# Patient Record
Sex: FEMALE | NOT HISPANIC OR LATINO | Employment: FULL TIME | URBAN - METROPOLITAN AREA
[De-identification: names, ages, dates, MRNs, and addresses within clinical notes are randomized per-mention and may not be internally consistent; named-entity substitution may affect disease eponyms.]

---

## 2024-01-22 ASSESSMENT — ENCOUNTER SYMPTOMS
HEADACHES: 0
NERVOUS/ANXIOUS: 0
MYALGIAS: 0
DIZZINESS: 0
ARTHRALGIAS: 0
HEMATURIA: 0
PARESTHESIAS: 0
PALPITATIONS: 0
FREQUENCY: 0
JOINT SWELLING: 0
CONSTIPATION: 0
CHILLS: 0
EYE PAIN: 0
FEVER: 0
NAUSEA: 0
WEAKNESS: 0
DIARRHEA: 0
DYSURIA: 0
COUGH: 0
HEMATOCHEZIA: 0
SORE THROAT: 0
HEARTBURN: 0
SHORTNESS OF BREATH: 0
ABDOMINAL PAIN: 0
BREAST MASS: 0

## 2024-01-23 ENCOUNTER — OFFICE VISIT (OUTPATIENT)
Dept: FAMILY MEDICINE | Facility: CLINIC | Age: 34
End: 2024-01-23
Payer: COMMERCIAL

## 2024-01-23 VITALS
BODY MASS INDEX: 26.74 KG/M2 | HEIGHT: 67 IN | OXYGEN SATURATION: 100 % | SYSTOLIC BLOOD PRESSURE: 106 MMHG | RESPIRATION RATE: 16 BRPM | DIASTOLIC BLOOD PRESSURE: 71 MMHG | TEMPERATURE: 98.3 F | HEART RATE: 66 BPM | WEIGHT: 170.4 LBS

## 2024-01-23 DIAGNOSIS — Z00.00 ROUTINE GENERAL MEDICAL EXAMINATION AT A HEALTH CARE FACILITY: Primary | ICD-10-CM

## 2024-01-23 DIAGNOSIS — Z30.09 FAMILY PLANNING COUNSELING: ICD-10-CM

## 2024-01-23 DIAGNOSIS — Z11.59 NEED FOR HEPATITIS C SCREENING TEST: ICD-10-CM

## 2024-01-23 DIAGNOSIS — Z12.4 CERVICAL CANCER SCREENING: ICD-10-CM

## 2024-01-23 DIAGNOSIS — Z11.4 SCREENING FOR HIV (HUMAN IMMUNODEFICIENCY VIRUS): ICD-10-CM

## 2024-01-23 LAB — HBA1C MFR BLD: 4.9 % (ref 0–5.6)

## 2024-01-23 PROCEDURE — 87591 N.GONORRHOEAE DNA AMP PROB: CPT

## 2024-01-23 PROCEDURE — 80048 BASIC METABOLIC PNL TOTAL CA: CPT

## 2024-01-23 PROCEDURE — 80061 LIPID PANEL: CPT

## 2024-01-23 PROCEDURE — 83036 HEMOGLOBIN GLYCOSYLATED A1C: CPT

## 2024-01-23 PROCEDURE — 99385 PREV VISIT NEW AGE 18-39: CPT | Mod: 25

## 2024-01-23 PROCEDURE — 86803 HEPATITIS C AB TEST: CPT

## 2024-01-23 PROCEDURE — G0145 SCR C/V CYTO,THINLAYER,RESCR: HCPCS

## 2024-01-23 PROCEDURE — 87389 HIV-1 AG W/HIV-1&-2 AB AG IA: CPT

## 2024-01-23 PROCEDURE — 87491 CHLMYD TRACH DNA AMP PROBE: CPT

## 2024-01-23 PROCEDURE — 36415 COLL VENOUS BLD VENIPUNCTURE: CPT

## 2024-01-23 PROCEDURE — 87624 HPV HI-RISK TYP POOLED RSLT: CPT

## 2024-01-23 ASSESSMENT — ENCOUNTER SYMPTOMS
DIARRHEA: 0
SORE THROAT: 0
ABDOMINAL PAIN: 0
ARTHRALGIAS: 0
NAUSEA: 0
HEMATURIA: 0
WEAKNESS: 0
HEADACHES: 0
CHILLS: 0
MYALGIAS: 0
DIZZINESS: 0
COUGH: 0
CONSTIPATION: 0
FREQUENCY: 0
NERVOUS/ANXIOUS: 0
SHORTNESS OF BREATH: 0
FEVER: 0
JOINT SWELLING: 0
DYSURIA: 0
PALPITATIONS: 0
EYE PAIN: 0

## 2024-01-23 NOTE — PROGRESS NOTES
Preventive Care Visit  [facility] CAN Patton CNP, Nurse Practitioner Primary Care  Jan 23, 2024       SUBJECTIVE:   Unique is a 33 year old, presenting for the following:  Physical (Discuss conceiving concerns)        1/23/2024     4:29 PM   Additional Questions   Roomed by jordan perla     Healthy Habits:     Getting at least 3 servings of Calcium per day:  Yes    Bi-annual eye exam:  Yes    Dental care twice a year:  Yes    Sleep apnea or symptoms of sleep apnea:  None    Diet:  Regular (no restrictions)    Frequency of exercise:  2-3 days/week    Duration of exercise:  15-30 minutes    Taking medications regularly:  Yes    Medication side effects:  Not applicable    Additional concerns today:  Yes    Nexplanon for two years and started to develop discharge every day and so has been taking progesterone. Plans to start trying to conceive in 6-8 months. Her mother lost two babies to Toxoplasmosis and is concerned that this may happen to her.     Works for [employer]    Today's PHQ-2 Score:       1/22/2024     7:10 PM   PHQ-2 ( 1999 [employer])   Q1: Little interest or pleasure in doing things 0   Q2: Feeling down, depressed or hopeless 0   PHQ-2 Score 0   Q1: Little interest or pleasure in doing things Not at all   Q2: Feeling down, depressed or hopeless Not at all   PHQ-2 Score 0     Have you ever done Advance Care Planning? (For example, a Health Directive, POLST, or a discussion with a medical provider or your loved ones about your wishes): No, advance care planning information given to patient to review.  Patient plans to discuss their wishes with loved ones or provider.      Social History     Tobacco Use    Smoking status: Never    Smokeless tobacco: Never   Substance Use Topics    Alcohol use: Never             1/22/2024     7:09 PM   Alcohol Use   Prescreen: >3 drinks/day or >7 drinks/week? No     Reviewed orders with patient.  Reviewed health maintenance and updated orders accordingly  "- Yes  Lab work is in process    Breast Cancer Screenin/22/2024     7:12 PM   Breast CA Risk Assessment (FHS-7)   Do you have a family history of breast, colon, or ovarian cancer? No / Unknown       Patient under 40 years of age: Routine Mammogram Screening not recommended.   Pertinent mammograms are reviewed under the imaging tab.    Discussed self breast exams    History of abnormal Pap smear: NO - age 30-65 PAP every 5 years with negative HPV co-testing recommended     Reviewed and updated as needed this visit by clinical staff   Tobacco  Allergies  Meds  Problems  Med Hx  Surg Hx  Fam Hx          Reviewed and updated as needed this visit by Provider   Tobacco  Allergies  Meds  Problems  Med Hx  Surg Hx  Fam Hx          Review of Systems   Constitutional:  Negative for chills and fever.   HENT:  Negative for congestion, ear pain, hearing loss and sore throat.    Eyes:  Negative for pain and visual disturbance.   Respiratory:  Negative for cough and shortness of breath.    Cardiovascular:  Negative for chest pain and palpitations.   Gastrointestinal:  Negative for abdominal pain, constipation, diarrhea and nausea.   Genitourinary:  Negative for dysuria, frequency, genital sores, hematuria, pelvic pain, urgency, vaginal bleeding and vaginal discharge.   Musculoskeletal:  Negative for arthralgias, joint swelling and myalgias.   Skin:  Negative for rash.   Neurological:  Negative for dizziness, weakness and headaches.   Psychiatric/Behavioral:  The patient is not nervous/anxious.        OBJECTIVE:   /71 (BP Location: Left arm, Patient Position: Sitting, Cuff Size: Adult Regular)   Pulse 66   Temp 98.3  F (36.8  C) (Temporal)   Resp 16   Ht 1.689 m (5' 6.5\")   Wt 77.3 kg (170 lb 6.4 oz)   LMP  (LMP Unknown)   SpO2 100%   BMI 27.09 kg/m     Estimated body mass index is 27.09 kg/m  as calculated from the following:    Height as of this encounter: 1.689 m (5' 6.5\").    Weight as of " this encounter: 77.3 kg (170 lb 6.4 oz).  Physical Exam  GENERAL: alert and no distress  EYES: Eyes grossly normal to inspection, PERRL and conjunctivae and sclerae normal  HENT: ear canals and TM's normal, nose and mouth without ulcers or lesions  NECK: no adenopathy, no asymmetry, masses, or scars  RESP: lungs clear to auscultation - no rales, rhonchi or wheezes  CV: regular rate and rhythm, normal S1 S2, no S3 or S4, no murmur, click or rub, no peripheral edema  ABDOMEN: soft, nontender, no hepatosplenomegaly, no masses and bowel sounds normal   (female): normal female external genitalia, normal urethral meatus, vaginal mucosa pink, moist, well rugated, and normal cervix without masses or discharge. PAP collected  MS: no gross musculoskeletal defects noted, no edema  SKIN: no suspicious lesions or rashes  NEURO: Normal strength and tone, mentation intact and speech normal  PSYCH: mentation appears normal, affect normal/bright  LYMPH: no cervical, supraclavicular, axillary, or inguinal adenopathy        ASSESSMENT/PLAN:   Routine general medical examination at a health care facility  Care gaps addressed. Routine labs ordered  - Lipid panel reflex to direct LDL Non-fasting; Future  - Basic metabolic panel  (Ca, Cl, CO2, Creat, Gluc, K, Na, BUN); Future  - Hemoglobin A1c; Future  - NEISSERIA GONORRHOEA PCR  - CHLAMYDIA TRACHOMATIS PCR  - Lipid panel reflex to direct LDL Non-fasting  - Basic metabolic panel  (Ca, Cl, CO2, Creat, Gluc, K, Na, BUN)  - Hemoglobin A1c    Screening for HIV (human immunodeficiency virus)  Discussed, ordered  - HIV Antigen Antibody Combo; Future  - HIV Antigen Antibody Combo    Need for hepatitis C screening test  Discussed, ordered  - Hepatitis C Screen Reflex to HCV RNA Quant and Genotype; Future  - Hepatitis C Screen Reflex to HCV RNA Quant and Genotype    Cervical cancer screening  PAP collected  - Pap Screen with HPV - recommended age 30 - 65 years    Family planning  "counseling  Mother lost two children to toxoplasmosis and is concerned that this may happen to her. She plans to start trying for children in the next 6-8 weeks. Has a nexplanon in currently. Referral placed for Ob/Gyn to discuss family planning and remove Nexplanon   - Ob/Gyn  Referral; Future          Counseling  Reviewed preventive health counseling, as reflected in patient instructions  Special attention given to:        Self breast exams       Regular exercise       Healthy diet/nutrition       Contraception       Family planning       Consider Hep C screening for all patients one time for ages 18-79 years       HIV screeninx in teen years, 1x in adult years, and at intervals if high risk      BMI  Estimated body mass index is 27.09 kg/m  as calculated from the following:    Height as of this encounter: 1.689 m (5' 6.5\").    Weight as of this encounter: 77.3 kg (170 lb 6.4 oz).         She reports that she has never smoked. She has never used smokeless tobacco.      Signed Electronically by: CAN Givens CNP  Answers submitted by the patient for this visit:  Annual Preventive Visit (Submitted on 2024)  Chief Complaint: Annual Exam:  Blood in stool: No  heartburn: No  peripheral edema: No  mood changes: No  Skin sensation changes: No  tenderness: No  breast mass: No  breast discharge: No    "

## 2024-01-24 LAB
ANION GAP SERPL CALCULATED.3IONS-SCNC: 13 MMOL/L (ref 7–15)
BUN SERPL-MCNC: 13.4 MG/DL (ref 6–20)
CALCIUM SERPL-MCNC: 9.6 MG/DL (ref 8.6–10)
CHLORIDE SERPL-SCNC: 100 MMOL/L (ref 98–107)
CHOLEST SERPL-MCNC: 214 MG/DL
CREAT SERPL-MCNC: 0.82 MG/DL (ref 0.51–0.95)
DEPRECATED HCO3 PLAS-SCNC: 25 MMOL/L (ref 22–29)
EGFRCR SERPLBLD CKD-EPI 2021: >90 ML/MIN/1.73M2
FASTING STATUS PATIENT QL REPORTED: ABNORMAL
GLUCOSE SERPL-MCNC: 85 MG/DL (ref 70–99)
HCV AB SERPL QL IA: NONREACTIVE
HDLC SERPL-MCNC: 68 MG/DL
HIV 1+2 AB+HIV1 P24 AG SERPL QL IA: NONREACTIVE
LDLC SERPL CALC-MCNC: 133 MG/DL
NONHDLC SERPL-MCNC: 146 MG/DL
POTASSIUM SERPL-SCNC: 4.6 MMOL/L (ref 3.4–5.3)
SODIUM SERPL-SCNC: 138 MMOL/L (ref 135–145)
TRIGL SERPL-MCNC: 67 MG/DL

## 2024-01-24 NOTE — RESULT ENCOUNTER NOTE
Alli Calhoun,     It was nice meeting you the other day,      Your cholesterol is slightly elevated. Working on diet and exercise can help bring down your cholesterol. I added some info below on ways to decrease your cholesterol.     Your kidneys, electrolytes and blood sugar are normal.     Your PAP and Chlamydia and gonorrhoea screenings will come back later this week.      Feel free to contact me via [facility] or call the clinic at [phone].     Sincerely,  Tara Bennett, LEYDA, FNP-C

## 2024-01-25 LAB
C TRACH DNA SPEC QL NAA+PROBE: NEGATIVE
N GONORRHOEA DNA SPEC QL NAA+PROBE: NEGATIVE

## 2024-01-29 LAB
BKR LAB AP GYN ADEQUACY: NORMAL
BKR LAB AP GYN INTERPRETATION: NORMAL
BKR LAB AP HPV REFLEX: NORMAL
BKR LAB AP PREVIOUS ABNORMAL: NORMAL
PATH REPORT.COMMENTS IMP SPEC: NORMAL
PATH REPORT.COMMENTS IMP SPEC: NORMAL
PATH REPORT.RELEVANT HX SPEC: NORMAL

## 2024-01-31 ENCOUNTER — PATIENT OUTREACH (OUTPATIENT)
Dept: FAMILY MEDICINE | Facility: CLINIC | Age: 34
End: 2024-01-31
Payer: COMMERCIAL

## 2024-01-31 DIAGNOSIS — R87.810 CERVICAL HIGH RISK HPV (HUMAN PAPILLOMAVIRUS) TEST POSITIVE: Primary | ICD-10-CM

## 2024-01-31 LAB
HUMAN PAPILLOMA VIRUS 16 DNA: NEGATIVE
HUMAN PAPILLOMA VIRUS 18 DNA: NEGATIVE
HUMAN PAPILLOMA VIRUS FINAL DIAGNOSIS: ABNORMAL
HUMAN PAPILLOMA VIRUS OTHER HR: POSITIVE

## 2024-09-30 ENCOUNTER — OFFICE VISIT (OUTPATIENT)
Dept: INTERNAL MEDICINE | Facility: CLINIC | Age: 34
End: 2024-09-30
Attending: INTERNAL MEDICINE
Payer: COMMERCIAL

## 2024-09-30 VITALS
BODY MASS INDEX: 26.84 KG/M2 | DIASTOLIC BLOOD PRESSURE: 79 MMHG | SYSTOLIC BLOOD PRESSURE: 121 MMHG | HEIGHT: 67 IN | HEART RATE: 76 BPM | WEIGHT: 171 LBS

## 2024-09-30 DIAGNOSIS — Z31.69 ENCOUNTER FOR PRECONCEPTION CONSULTATION: ICD-10-CM

## 2024-09-30 DIAGNOSIS — N93.9 ABNORMAL UTERINE BLEEDING: Primary | ICD-10-CM

## 2024-09-30 PROCEDURE — 99203 OFFICE O/P NEW LOW 30 MIN: CPT | Performed by: INTERNAL MEDICINE

## 2024-09-30 PROCEDURE — 99213 OFFICE O/P EST LOW 20 MIN: CPT | Performed by: INTERNAL MEDICINE

## 2024-09-30 SDOH — HEALTH STABILITY: PHYSICAL HEALTH: ON AVERAGE, HOW MANY MINUTES DO YOU ENGAGE IN EXERCISE AT THIS LEVEL?: 20 MIN

## 2024-09-30 SDOH — HEALTH STABILITY: PHYSICAL HEALTH: ON AVERAGE, HOW MANY DAYS PER WEEK DO YOU ENGAGE IN MODERATE TO STRENUOUS EXERCISE (LIKE A BRISK WALK)?: 4 DAYS

## 2024-09-30 ASSESSMENT — SOCIAL DETERMINANTS OF HEALTH (SDOH): HOW OFTEN DO YOU GET TOGETHER WITH FRIENDS OR RELATIVES?: ONCE A WEEK

## 2024-09-30 NOTE — PROGRESS NOTES
"  Assessment & Plan     Abnormal uterine bleeding  Discussed possible causes of abnormal uterine bleeding.  Given that patient is currently on Nexplanon, suspect breakthrough bleeding.  Recommend consultation with OB/GYN for further evaluation and assistance with management.  Pelvic ultrasound was ordered.  - US Pelvic Complete with Transvaginal; Future  - Ob/Gyn  Referral; Future    Preconception counseling.  Discussed exposure to cats as risk factor for toxoplasmosis.  Given the patient's mom was living in [place] with a lot of stray cats around her, toxoplasmosis was likely a consequence of exposure.  Patient was assured that her risk is significantly lower given that she does not own a cat.  Reviewed healthy strategies, including healthy diet and exercise with patient and her .    I spent a total of 30 minutes on the day of the visit.   Time spent by me doing chart review, history and exam, documentation and further activities per the note    BMI  Estimated body mass index is 27.19 kg/m  as calculated from the following:    Height as of this encounter: 1.689 m (5' 6.5\").    Weight as of this encounter: 77.6 kg (171 lb).       Counseling  Appropriate preventive services were addressed with this patient via screening, questionnaire, or discussion as appropriate for fall prevention, nutrition, physical activity, Tobacco-use cessation, social engagement, weight loss and cognition.  Checklist reviewing preventive services available has been given to the patient.  Reviewed patient's diet, addressing concerns and/or questions.           No follow-ups on file.    Nate Calhoun is a 34 year old, presenting for the following health issues:  Consult (Pre-pregnancy exam)    HPI     Patient reports that she is having lower abdominal pain. Pain started about 2 weeks ago. She also had some discharge that is brown in color and very scant. She is currently using Nexplanon for contraception. She states that " "she developed some bleeding that was initially prescribed birth control pill to help address the bleeding. She was advised to stop birth control pill. She states that she has been having regular periods with Nexplanon that are lighter than regular periods and associated with abdominal pain. She is planning pregnancy in 6-8 months.   Patient states that she is considering pregnancy within the next year. She is wondering is anything needs to be checked prior to her becoming pregnant.       Objective    /79   Pulse 76   Ht 1.689 m (5' 6.5\")   Wt 77.6 kg (171 lb)   BMI 27.19 kg/m    Body mass index is 27.19 kg/m .  Physical Exam   GENERAL: alert and no distress  EYES: Eyes grossly normal to inspection, PERRL and conjunctivae and sclerae normal  RESP: lungs clear to auscultation - no rales, rhonchi or wheezes  CV: regular rate and rhythm, normal S1 S2, no S3 or S4, no murmur, click or rub, no peripheral edema  ABDOMEN: soft, nontender and bowel sounds normal  MS: no gross musculoskeletal defects noted, no edema  NEURO: Normal strength and tone, mentation intact and speech normal  PSYCH: mentation appears normal, affect normal/bright            Signed Electronically by: Lizzy Bronson MD    "

## 2024-09-30 NOTE — PATIENT INSTRUCTIONS
Thank you for trusting us with your care!     If you need to contact us for questions about:    Symptoms, Scheduling & Medical Questions: Non-urgent (2-3 day response) Ki message, Urgent (needing response today) [phone] (if after 3:30pm next day response)   Prescriptions: Please call your Pharmacy   Billing: [facility] [phone] or [facility]:[phone]

## 2024-09-30 NOTE — LETTER
"9/30/2024       RE: Unique Gilliland  [address] [facility] [address]     Dear Colleague,    Thank you for referring your patient, Unique Gilliland, to the [facility] [place] [facility]. Please see a copy of my visit note below.    Chief Complaint   Patient presents with     Consult     Pre-pregnancy exam    Tricia Lopez LPN       Assessment & Plan    Abnormal uterine bleeding  Discussed possible causes of abnormal uterine bleeding.  Given that patient is currently on Nexplanon, suspect breakthrough bleeding.  Recommend consultation with OB/GYN for further evaluation and assistance with management.  Pelvic ultrasound was ordered.  - US Pelvic Complete with Transvaginal; Future  - Ob/Gyn  Referral; Future    Preconception counseling.  Discussed exposure to cats as risk factor for toxoplasmosis.  Given the patient's mom was living in [place] with a lot of stray cats around her, toxoplasmosis was likely a consequence of exposure.  Patient was assured that her risk is significantly lower given that she does not own a cat.  Reviewed healthy strategies, including healthy diet and exercise with patient and her .    I spent a total of 30 minutes on the day of the visit.   Time spent by me doing chart review, history and exam, documentation and further activities per the note    BMI  Estimated body mass index is 27.19 kg/m  as calculated from the following:    Height as of this encounter: 1.689 m (5' 6.5\").    Weight as of this encounter: 77.6 kg (171 lb).       Counseling  Appropriate preventive services were addressed with this patient via screening, questionnaire, or discussion as appropriate for fall prevention, nutrition, physical activity, Tobacco-use cessation, social engagement, weight loss and cognition.  Checklist reviewing preventive services available has been given to the patient.  Reviewed patient's diet, addressing " "concerns and/or questions.           No follow-ups on file.    Subjective  Unique is a 34 year old, presenting for the following health issues:  Consult (Pre-pregnancy exam)    HPI     Patient reports that she is having lower abdominal pain. Pain started about 2 weeks ago. She also had some discharge that is brown in color and very scant. She is currently using Nexplanon for contraception. She states that she developed some bleeding that was initially prescribed birth control pill to help address the bleeding. She was advised to stop birth control pill. She states that she has been having regular periods with Nexplanon that are lighter than regular periods and associated with abdominal pain. She is planning pregnancy in 6-8 months.   Patient states that she is considering pregnancy within the next year. She is wondering is anything needs to be checked prior to her becoming pregnant.       Objective   /79   Pulse 76   Ht 1.689 m (5' 6.5\")   Wt 77.6 kg (171 lb)   BMI 27.19 kg/m    Body mass index is 27.19 kg/m .  Physical Exam   GENERAL: alert and no distress  EYES: Eyes grossly normal to inspection, PERRL and conjunctivae and sclerae normal  RESP: lungs clear to auscultation - no rales, rhonchi or wheezes  CV: regular rate and rhythm, normal S1 S2, no S3 or S4, no murmur, click or rub, no peripheral edema  ABDOMEN: soft, nontender and bowel sounds normal  MS: no gross musculoskeletal defects noted, no edema  NEURO: Normal strength and tone, mentation intact and speech normal  PSYCH: mentation appears normal, affect normal/bright            Signed Electronically by: Lizzy Bronson MD        Again, thank you for allowing me to participate in the care of your patient.      Sincerely,    Lizzy Bronson MD    "

## 2024-10-11 ENCOUNTER — TELEPHONE (OUTPATIENT)
Dept: OBGYN | Facility: CLINIC | Age: 34
End: 2024-10-11
Payer: COMMERCIAL

## 2024-10-21 ENCOUNTER — TELEPHONE (OUTPATIENT)
Dept: INTERNAL MEDICINE | Facility: CLINIC | Age: 34
End: 2024-10-21
Payer: COMMERCIAL

## 2024-11-08 ENCOUNTER — TELEPHONE (OUTPATIENT)
Dept: OBGYN | Facility: CLINIC | Age: 34
End: 2024-11-08
Payer: COMMERCIAL

## 2024-12-20 ENCOUNTER — HOSPITAL ENCOUNTER (OUTPATIENT)
Dept: ULTRASOUND IMAGING | Facility: CLINIC | Age: 34
Discharge: HOME | End: 2024-12-20
Attending: INTERNAL MEDICINE | Admitting: INTERNAL MEDICINE
Payer: COMMERCIAL

## 2024-12-20 DIAGNOSIS — N93.9 ABNORMAL UTERINE BLEEDING: ICD-10-CM

## 2024-12-20 PROCEDURE — 76830 TRANSVAGINAL US NON-OB: CPT | Mod: 26 | Performed by: STUDENT IN AN ORGANIZED HEALTH CARE EDUCATION/TRAINING PROGRAM

## 2024-12-20 PROCEDURE — 76856 US EXAM PELVIC COMPLETE: CPT | Mod: 26 | Performed by: STUDENT IN AN ORGANIZED HEALTH CARE EDUCATION/TRAINING PROGRAM

## 2024-12-20 PROCEDURE — 76856 US EXAM PELVIC COMPLETE: CPT

## 2024-12-20 PROCEDURE — 76830 TRANSVAGINAL US NON-OB: CPT

## 2025-01-03 PROBLEM — R87.810 CERVICAL HIGH RISK HPV (HUMAN PAPILLOMAVIRUS) TEST POSITIVE: Status: ACTIVE | Noted: 2024-01-23

## 2025-03-16 ENCOUNTER — HEALTH MAINTENANCE LETTER (OUTPATIENT)
Age: 35
End: 2025-03-16